# Patient Record
Sex: FEMALE | Race: WHITE | Employment: FULL TIME | URBAN - NONMETROPOLITAN AREA
[De-identification: names, ages, dates, MRNs, and addresses within clinical notes are randomized per-mention and may not be internally consistent; named-entity substitution may affect disease eponyms.]

---

## 2022-02-04 ENCOUNTER — HOSPITAL ENCOUNTER (OUTPATIENT)
Age: 47
Setting detail: OBSERVATION
Discharge: HOME | End: 2022-02-05
Attending: EMERGENCY MEDICINE | Admitting: SURGERY
Payer: COMMERCIAL

## 2022-02-04 DIAGNOSIS — T58.91XA TOXIC EFFECT OF CARBON MONOXIDE, UNINTENTIONAL, INITIAL ENCOUNTER: Primary | ICD-10-CM

## 2022-02-04 PROBLEM — Z77.29 CARBON MONOXIDE EXPOSURE: Status: ACTIVE | Noted: 2022-02-04

## 2022-02-04 LAB
ANION GAP SERPL CALCULATED.3IONS-SCNC: 16 MEQ/L (ref 8–16)
BASE EXCESS (CALCULATED): 0.9 MMOL/L (ref -2.5–2.5)
BASOPHILS # BLD: 1 %
BASOPHILS ABSOLUTE: 0.1 THOU/MM3 (ref 0–0.1)
BUN BLDV-MCNC: 17 MG/DL (ref 7–22)
CALCIUM SERPL-MCNC: 9.6 MG/DL (ref 8.5–10.5)
CARBON MONOXIDE, BLOOD: 14.2 % CO SAT
CARBON MONOXIDE, BLOOD: 47.6 % CO SAT
CARBON MONOXIDE, BLOOD: 6.4 % CO SAT
CHLORIDE BLD-SCNC: 100 MEQ/L (ref 98–111)
CO2: 22 MEQ/L (ref 23–33)
COLLECTED BY:: ABNORMAL
CREAT SERPL-MCNC: 0.8 MG/DL (ref 0.4–1.2)
DEVICE: ABNORMAL
EOSINOPHIL # BLD: 1.3 %
EOSINOPHILS ABSOLUTE: 0.1 THOU/MM3 (ref 0–0.4)
ERYTHROCYTE [DISTWIDTH] IN BLOOD BY AUTOMATED COUNT: 12.6 % (ref 11.5–14.5)
ERYTHROCYTE [DISTWIDTH] IN BLOOD BY AUTOMATED COUNT: 42.1 FL (ref 35–45)
GFR SERPL CREATININE-BSD FRML MDRD: 77 ML/MIN/1.73M2
GLUCOSE BLD-MCNC: 137 MG/DL (ref 70–108)
HCO3: 27 MMOL/L (ref 23–28)
HCT VFR BLD CALC: 42.9 % (ref 37–47)
HEMOGLOBIN: 14.3 GM/DL (ref 12–16)
IFIO2: 100
IMMATURE GRANS (ABS): 0.08 THOU/MM3 (ref 0–0.07)
IMMATURE GRANULOCYTES: 0.7 %
LACTIC ACID, SEPSIS: 1 MMOL/L (ref 0.5–1.9)
LACTIC ACID, SEPSIS: 1 MMOL/L (ref 0.5–1.9)
LYMPHOCYTES # BLD: 30.5 %
LYMPHOCYTES ABSOLUTE: 3.5 THOU/MM3 (ref 1–4.8)
MCH RBC QN AUTO: 30.8 PG (ref 26–33)
MCHC RBC AUTO-ENTMCNC: 33.3 GM/DL (ref 32.2–35.5)
MCV RBC AUTO: 92.5 FL (ref 81–99)
MONOCYTES # BLD: 6.1 %
MONOCYTES ABSOLUTE: 0.7 THOU/MM3 (ref 0.4–1.3)
NUCLEATED RED BLOOD CELLS: 0 /100 WBC
O2 SATURATION: 100 %
OSMOLALITY CALCULATION: 279.4 MOSMOL/KG (ref 275–300)
PCO2: 46 MMHG (ref 35–45)
PH BLOOD GAS: 7.37 (ref 7.35–7.45)
PLATELET # BLD: 575 THOU/MM3 (ref 130–400)
PMV BLD AUTO: 8.6 FL (ref 9.4–12.4)
PO2: 351 MMHG (ref 71–104)
POTASSIUM REFLEX MAGNESIUM: 4.5 MEQ/L (ref 3.5–5.2)
PREGNANCY, SERUM: NEGATIVE
RBC # BLD: 4.64 MILL/MM3 (ref 4.2–5.4)
SEG NEUTROPHILS: 60.4 %
SEGMENTED NEUTROPHILS ABSOLUTE COUNT: 6.9 THOU/MM3 (ref 1.8–7.7)
SODIUM BLD-SCNC: 138 MEQ/L (ref 135–145)
SOURCE, BLOOD GAS: ABNORMAL
TROPONIN T: < 0.01 NG/ML
TROPONIN T: < 0.01 NG/ML
WBC # BLD: 11.4 THOU/MM3 (ref 4.8–10.8)

## 2022-02-04 PROCEDURE — 84703 CHORIONIC GONADOTROPIN ASSAY: CPT

## 2022-02-04 PROCEDURE — 82375 ASSAY CARBOXYHB QUANT: CPT

## 2022-02-04 PROCEDURE — 80048 BASIC METABOLIC PNL TOTAL CA: CPT

## 2022-02-04 PROCEDURE — 93005 ELECTROCARDIOGRAM TRACING: CPT | Performed by: STUDENT IN AN ORGANIZED HEALTH CARE EDUCATION/TRAINING PROGRAM

## 2022-02-04 PROCEDURE — G0378 HOSPITAL OBSERVATION PER HR: HCPCS

## 2022-02-04 PROCEDURE — 83605 ASSAY OF LACTIC ACID: CPT

## 2022-02-04 PROCEDURE — 84484 ASSAY OF TROPONIN QUANT: CPT

## 2022-02-04 PROCEDURE — 6820000001 HC L2 TRAUMA SURGERY EVALUATION: Performed by: SURGERY

## 2022-02-04 PROCEDURE — 2580000003 HC RX 258: Performed by: NURSE PRACTITIONER

## 2022-02-04 PROCEDURE — 99285 EMERGENCY DEPT VISIT HI MDM: CPT

## 2022-02-04 PROCEDURE — 85025 COMPLETE CBC W/AUTO DIFF WBC: CPT

## 2022-02-04 PROCEDURE — 99219 PR INITIAL OBSERVATION CARE/DAY 50 MINUTES: CPT | Performed by: SURGERY

## 2022-02-04 PROCEDURE — 82803 BLOOD GASES ANY COMBINATION: CPT

## 2022-02-04 PROCEDURE — APPSS180 APP SPLIT SHARED TIME > 60 MINUTES: Performed by: NURSE PRACTITIONER

## 2022-02-04 PROCEDURE — 36415 COLL VENOUS BLD VENIPUNCTURE: CPT

## 2022-02-04 PROCEDURE — 36600 WITHDRAWAL OF ARTERIAL BLOOD: CPT

## 2022-02-04 RX ORDER — SODIUM CHLORIDE 9 MG/ML
25 INJECTION, SOLUTION INTRAVENOUS PRN
Status: DISCONTINUED | OUTPATIENT
Start: 2022-02-04 | End: 2022-02-05 | Stop reason: HOSPADM

## 2022-02-04 RX ORDER — SODIUM PHOSPHATE, DIBASIC AND SODIUM PHOSPHATE, MONOBASIC 7; 19 G/133ML; G/133ML
1 ENEMA RECTAL DAILY PRN
Status: DISCONTINUED | OUTPATIENT
Start: 2022-02-04 | End: 2022-02-05 | Stop reason: HOSPADM

## 2022-02-04 RX ORDER — SODIUM CHLORIDE 0.9 % (FLUSH) 0.9 %
5-40 SYRINGE (ML) INJECTION PRN
Status: DISCONTINUED | OUTPATIENT
Start: 2022-02-04 | End: 2022-02-05 | Stop reason: HOSPADM

## 2022-02-04 RX ORDER — ONDANSETRON 4 MG/1
4 TABLET, ORALLY DISINTEGRATING ORAL EVERY 8 HOURS PRN
Status: DISCONTINUED | OUTPATIENT
Start: 2022-02-04 | End: 2022-02-05 | Stop reason: HOSPADM

## 2022-02-04 RX ORDER — VENLAFAXINE HYDROCHLORIDE 75 MG/1
75 CAPSULE, EXTENDED RELEASE ORAL DAILY
COMMUNITY

## 2022-02-04 RX ORDER — SODIUM CHLORIDE 0.9 % (FLUSH) 0.9 %
5-40 SYRINGE (ML) INJECTION EVERY 12 HOURS SCHEDULED
Status: DISCONTINUED | OUTPATIENT
Start: 2022-02-04 | End: 2022-02-05 | Stop reason: HOSPADM

## 2022-02-04 RX ORDER — ONDANSETRON 2 MG/ML
4 INJECTION INTRAMUSCULAR; INTRAVENOUS EVERY 6 HOURS PRN
Status: DISCONTINUED | OUTPATIENT
Start: 2022-02-04 | End: 2022-02-05 | Stop reason: HOSPADM

## 2022-02-04 RX ADMIN — SODIUM CHLORIDE, PRESERVATIVE FREE 10 ML: 5 INJECTION INTRAVENOUS at 20:12

## 2022-02-04 ASSESSMENT — ENCOUNTER SYMPTOMS
DIARRHEA: 0
FACIAL SWELLING: 0
WHEEZING: 0
RHINORRHEA: 0
STRIDOR: 0
VOICE CHANGE: 0
EYE ITCHING: 0
ABDOMINAL PAIN: 0
CONSTIPATION: 0
TROUBLE SWALLOWING: 0
ABDOMINAL DISTENTION: 0
SORE THROAT: 0
PHOTOPHOBIA: 0
CHEST TIGHTNESS: 0
SINUS PRESSURE: 0
NAUSEA: 0
CHOKING: 0
EYE REDNESS: 0
APNEA: 0
SHORTNESS OF BREATH: 0
COLOR CHANGE: 0
COUGH: 0
BACK PAIN: 0
EYE PAIN: 0
VOMITING: 0
BLOOD IN STOOL: 0
EYE DISCHARGE: 0

## 2022-02-04 ASSESSMENT — PAIN SCALES - GENERAL: PAINLEVEL_OUTOF10: 0

## 2022-02-04 NOTE — ED NOTES
Pt ambulated to bathroom with steady gait. Pt  VSS. Pt denies dizziness.       Saima Ansari RN  02/04/22 1332

## 2022-02-04 NOTE — PROGRESS NOTES
Pt admitted to  5K6 via from ED from ED. Complaints: carbon monoxide exposure. IV into the antecubital left, condition patent and no redness. IV site free of s/s of infection or infiltration. Vital signs obtained. Assessment and data collection initiated. Two nurse skin assessment performed by Yudelka MOORE and Xiomara RN. Oriented to room. Policies and procedures for 5 explained. All questions answered with no further questions at this time. Fall prevention and safety brochure discussed with patient. Bed alarm on. Call light in reach. Oriented to room. Kostas Paul RN, RN 2/4/2022 3:41 PM     Explained patients right to have family, representative or physician notified of their admission. Patient has Declined for physician to be notified. Patient has Declined for family/representative to be notified.

## 2022-02-04 NOTE — H&P
Trauma H&P   Dr. Emmitt Sandhoff    Patient:  April Carmela  Admit date: 2/4/2022   YOB: 1975 Date of Evaluation: 2/4/2022  MRN: [id number]  Acct: [de-identified]    Injury Date:2/4/2022  Injury time:PTA  PCP: SHELLEY Rosenberg - CNP   Referring physician: Dr. Jose Jackson    Time of Trauma Surgeon Notification:  [phone] February 4, 2022  Time of Trama EMILEE Arrival: 1250  Time of Trauma Surgeon Arrival: [phone] February 4, 2022    Assessment:    Carbon monoxide exposure  Leukocytosis  Thrombocytosis   Plan:    Admit to Trauma Services    Carbon Monoxide exposure   - Telemetry monitoring              - Carboxyhemoglobin level at 6pm this evening. If abnormal, repeat in AM              - Continue NRB at 15 liters              - Troponin at 6pm              - EKG in AM              - CBC in AM              - ABG in AM              - Neuro checks q4 hours    Leukocytosis   - WBC 11.4 on arrival    - Likely reactive from above   - Afebrile   - Will monitor, repeat CBC in AM    Thrombocytosis   - platelet count 957 on arrival   - Likely reactive vs essential   - Repeat CBC in AM     General diet  Repeat labs as above  Up as tolerated  Prophylaxis: C&DB, stool softeners PRN, SCDs  Discharge disposition pending clinical course              - From home with . Anticipate home tomorrow.      Activation: []Level I (Trauma Alert) []Level II (Injury Call) [x]Level III (Trauma Consult) []Downgraded  Mode of Arrival: EMS transportation  Referring Facility: N/A   Loss of Consciousness []No [x]Yes[]Unknown  Brief Duration(min)  Mechanism of Injury:  []Motor Vehicle crash   []Single Vehicle [] []Passenger []Scene Fatality []Front Seat  []Restrained   []Air Bag Deployed   []Ejected []Rollover []Pedestrian []Trapped   Type of vehicle:   Protective Devices:   []Motorcycle  Wearing Helmet []Yes []No  []Bicycle  Wearing Helmet []Yes []No  []Fall   Distance -    []Assault    Abuse Reported []Yes []No []Gunshot  []Stabbing  []Work Related  [x]Burn: []Flame []Scald []Electrical []Chemical []Contact [x]Inhalation []House Fire  []Other:   Patient Active Problem List   Diagnosis    Carbon monoxide exposure     Subjective   Chief Complaint: Carbon monoxide exposure    History of Present Illness:  April is a 51year old female presenting to the Emergency Department for evaluation of exposure to carbon monoxide. She reports that this morning the furnace/boiler in her home was not functioning properly and her  was working on it. Shortly after, she noticed that her eyes were burning and watering and her legs felt weak. She researched CO2 poisoning on her phone and before she could tell her  what she thought was happening, she had passed out. Her  called 46 and upon EMS arrival, she was unconscious. Oxygen was applied via NRB and within a brief period, she regained consciousness. She reports that she recently recovered from [address]. She is a non smoker. She currently denies any headache, chest pain, shortness of breath, nausea, vomiting. The weakness has resolved. Review of Systems:   Review of Systems   Constitutional: Negative for activity change, appetite change, chills, diaphoresis, fatigue, fever and unexpected weight change. HENT: Negative for congestion, dental problem, drooling, ear discharge, ear pain, facial swelling, hearing loss, mouth sores, nosebleeds, postnasal drip, rhinorrhea, sinus pressure, sneezing, sore throat, tinnitus, trouble swallowing and voice change. Eyes: Negative for photophobia, pain, discharge, redness, itching and visual disturbance. Respiratory: Negative for apnea, cough, choking, chest tightness, shortness of breath, wheezing and stridor. Cardiovascular: Negative for chest pain, palpitations and leg swelling. Gastrointestinal: Negative for abdominal distention, abdominal pain, blood in stool, constipation, diarrhea, nausea and vomiting. Endocrine: Negative for cold intolerance, heat intolerance, polydipsia, polyphagia and polyuria. Genitourinary: Negative for difficulty urinating, dysuria, flank pain, frequency, hematuria and urgency. Musculoskeletal: Positive for gait problem. Negative for arthralgias, back pain, joint swelling, myalgias, neck pain and neck stiffness. Skin: Negative for color change, pallor, rash and wound. Allergic/Immunologic: Negative for environmental allergies, food allergies and immunocompromised state. Neurological: Positive for syncope and weakness. Negative for dizziness, tremors, seizures, facial asymmetry, speech difficulty, light-headedness, numbness and headaches. Hematological: Negative for adenopathy. Does not bruise/bleed easily. Psychiatric/Behavioral: Negative for agitation, behavioral problems, confusion, decreased concentration, dysphoric mood, hallucinations, self-injury, sleep disturbance and suicidal ideas. The patient is not nervous/anxious and is not hyperactive. Patient has no known allergies. History reviewed. No pertinent surgical history. History reviewed. No pertinent past medical history. History reviewed. No pertinent surgical history. Social History     Socioeconomic History    Marital status:      Spouse name: None    Number of children: None    Years of education: None    Highest education level: None   Occupational History    None   Tobacco Use    Smoking status: Never Smoker    Smokeless tobacco: Never Used   Vaping Use    Vaping Use: Never used   Substance and Sexual Activity    Alcohol use:  Yes    Drug use: Never    Sexual activity: Yes     Partners: Male   Other Topics Concern    None   Social History Narrative    None     Social Determinants of Health     Financial Resource Strain:     Difficulty of Paying Living Expenses: Not on file   Food Insecurity:     Worried About Running Out of Food in the Last Year: Not on file    [facility] in the Last Year: Not on file   Transportation Needs:     Lack of Transportation (Medical): Not on file    Lack of Transportation (Non-Medical): Not on file   Physical Activity:     Days of Exercise per Week: Not on file    Minutes of Exercise per Session: Not on file   Stress:     Feeling of Stress : Not on file   Social Connections:     Frequency of Communication with Friends and Family: Not on file    Frequency of Social Gatherings with Friends and Family: Not on file    Attends [employer] Services: Not on file    Active Member of [address] [place] [address] [employer] or Organizations: Not on file    Attends Club or Organization Meetings: Not on file    Marital Status: Not on file   Intimate Partner Violence:     Fear of Current or Ex-Partner: Not on file    Emotionally Abused: Not on file    Physically Abused: Not on file    Sexually Abused: Not on file   Housing Stability:     Unable to Pay for Housing in the Last Year: Not on file    Number of [place] in the Last Year: Not on file    Unstable Housing in the Last Year: Not on file     History reviewed. No pertinent family history.     Home medications:    Previous Medications    No medications on file       Hospital medications:  Scheduled Meds:  Continuous Infusions:  PRN Meds:  Objective   ED TRIAGE VITALS  BP: 128/88, Temp: 98 °F (36.7 °C), Pulse: 91, Resp: 12, SpO2: 100 %  Lilly Coma Scale  Eye Opening: Spontaneous  Best Verbal Response: Oriented  Best Motor Response: Obeys commands  Bela Coma Scale Score: 15  Results for orders placed or performed during the hospital encounter of 02/04/22   Carboxyhemoglobin   Result Value Ref Range    Carbon Monoxide, Blood 47.6 % CO SAT   Blood gas, arterial   Result Value Ref Range    pH, Blood Gas 7.37 7.35 - 7.45    PCO2 46 (H) 35 - 45 mmhg    PO2 351 (H) 71 - 104 mmhg    HCO3 27 23 - 28 mmol/l    Base Excess (Calculated) 0.9 -2.5 - 2.5 mmol/l    O2 Sat 100 %    IFIO2 100     DEVICE NRB     Source: ELEAZAR Mckeon     COLLECTED BY: [id number]    CBC Auto Differential   Result Value Ref Range    WBC 11.4 (H) 4.8 - 10.8 thou/mm3    RBC 4.64 4.20 - 5.40 mill/mm3    Hemoglobin 14.3 12.0 - 16.0 gm/dl    Hematocrit 42.9 37.0 - 47.0 %    MCV 92.5 81.0 - 99.0 fL    MCH 30.8 26.0 - 33.0 pg    MCHC 33.3 32.2 - 35.5 gm/dl    RDW-CV 12.6 11.5 - 14.5 %    RDW-SD 42.1 35.0 - 45.0 fL    Platelets 966 (H) 382 - 400 thou/mm3    MPV 8.6 (L) 9.4 - 12.4 fL    Seg Neutrophils 60.4 %    Lymphocytes 30.5 %    Monocytes 6.1 %    Eosinophils 1.3 %    Basophils 1.0 %    Immature Granulocytes 0.7 %    Segs Absolute 6.9 1.8 - 7.7 thou/mm3    Lymphocytes Absolute 3.5 1.0 - 4.8 thou/mm3    Monocytes Absolute 0.7 0.4 - 1.3 thou/mm3    Eosinophils Absolute 0.1 0.0 - 0.4 thou/mm3    Basophils Absolute 0.1 0.0 - 0.1 thou/mm3    Immature Grans (Abs) 0.08 (H) 0.00 - 0.07 thou/mm3    nRBC 0 /100 wbc   Basic Metabolic Panel w/ Reflex to MG   Result Value Ref Range    Sodium 138 135 - 145 meq/L    Potassium reflex Magnesium 4.5 3.5 - 5.2 meq/L    Chloride 100 98 - 111 meq/L    CO2 22 (L) 23 - 33 meq/L    Glucose 137 (H) 70 - 108 mg/dL    BUN 17 7 - 22 mg/dL    CREATININE 0.8 0.4 - 1.2 mg/dL    Calcium 9.6 8.5 - 10.5 mg/dL   Lactate, Sepsis   Result Value Ref Range    Lactic Acid, Sepsis 1.0 0.5 - 1.9 mmol/L   Lactate, Sepsis   Result Value Ref Range    Lactic Acid, Sepsis 1.0 0.5 - 1.9 mmol/L   Troponin   Result Value Ref Range    Troponin T < 0.010 ng/ml   HCG Qualitative, Serum   Result Value Ref Range    Preg, Serum NEGATIVE NEGATIVE   Anion Gap   Result Value Ref Range    Anion Gap 16.0 8.0 - 16.0 meq/L   Glomerular Filtration Rate, Estimated   Result Value Ref Range    Est, Glom Filt Rate 77 (A) ml/min/1.73m2   Osmolality   Result Value Ref Range    Osmolality Calc 279.4 275.0 - 300.0 mOsmol/kg   Carboxyhemoglobin   Result Value Ref Range    Carbon Monoxide, Blood 14.2 % CO SAT   EKG 12 Lead   Result Value Ref Range    Ventricular Rate 105 BPM    Atrial Rate 105 BPM    P-R Interval 166 ms    QRS Duration 80 ms    Q-T Interval 354 ms    QTc Calculation (Bazett) 467 ms    P Axis 53 degrees    R Axis 84 degrees    T Axis 2 degrees       Physical Exam:  Patient Vitals for the past 24 hrs:   BP Temp Temp src Pulse Resp SpO2 Height Weight   02/04/22 1232 128/88 -- -- 91 12 100 % -- --   02/04/22 1059 -- -- -- 89 10 100 % -- --   02/04/22 1029 129/86 -- -- 106 17 99 % -- --   02/04/22 1025 -- 98 °F (36.7 °C) Oral 105 17 98 % 5' 4\" (1.626 m) 150 lb (68 kg)     Primary Assessment:  Airway: Patent, trachea midline  Breathing: Breath sounds present and equal bilaterally, spontaneous, and unlabored  Circulation: Hemodynamically stable, 2+ central and peripheral pulses. Disability: GALLAGHER x 4, following commands. GCS =15    Secondary Assessment:  General: Alert, NAD. Head: Normocephalic, mid face stable. Tympanic membranes intact. Nares patent bilaterally, no epistaxis. Mouth clear of foreign bodies, no lacerations or abrasions. Eyes: PERRLA. EOMI. Nontraumatic. Neurologic: A & O x3. Following commands. CN 2-12 intact  Neck: trachea midline. Cervical spines NTTP midline, without step-offs, crepitus or deformity. Back:TL spines are NTTP midline, without step-offs, crepitus or deformity. No abrasions, contusions, or ecchymosis noted. Lungs: Clear to auscultation bilaterally. Chest Wall: Chest rise symmetrical.  Chest wall without tenderness to palpation. No crepitus, deformities, lacerations, or abrasions. Heart: RRR. Normal S1/S2. No obvious M/G/R. Abdomen:  Soft, NTTP. No guarding. Non-peritoneal.  Pelvis:  NTTP, stable to compression. Femoral pulses 2+. GI/: No blood at the urinary meatus. No gross hematuria. Extremities: No gross deformities. PMS intact. Radial /DP/PT pulses 2+ bilaterally. Skin: Skin warm and dry. Normal for ethnicity.       Radiology:     No orders to display     Fast Exam: No    Electronically signed by SHELLEY Hand - CNP on 2/4/2022 at 2:35 PM

## 2022-02-04 NOTE — ED NOTES
5K was informed of pt coming up to the floor. Pt up to 5K via wheelchair in stable condition.      Nuvia Harness  02/04/22 1730
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
## 2022-02-05 VITALS
SYSTOLIC BLOOD PRESSURE: 101 MMHG | WEIGHT: 167.55 LBS | BODY MASS INDEX: 28.6 KG/M2 | DIASTOLIC BLOOD PRESSURE: 60 MMHG | HEIGHT: 64 IN | OXYGEN SATURATION: 100 % | HEART RATE: 84 BPM | TEMPERATURE: 97.7 F | RESPIRATION RATE: 16 BRPM

## 2022-02-05 LAB
ALLEN TEST: POSITIVE
BASE EXCESS (CALCULATED): 0.7 MMOL/L (ref -2.5–2.5)
CARBON MONOXIDE, BLOOD: 5.7 % CO SAT
COLLECTED BY:: ABNORMAL
DEVICE: ABNORMAL
EKG ATRIAL RATE: 105 BPM
EKG ATRIAL RATE: 84 BPM
EKG P AXIS: 25 DEGREES
EKG P AXIS: 53 DEGREES
EKG P-R INTERVAL: 140 MS
EKG P-R INTERVAL: 166 MS
EKG Q-T INTERVAL: 354 MS
EKG Q-T INTERVAL: 380 MS
EKG QRS DURATION: 78 MS
EKG QRS DURATION: 80 MS
EKG QTC CALCULATION (BAZETT): 449 MS
EKG QTC CALCULATION (BAZETT): 467 MS
EKG R AXIS: 81 DEGREES
EKG R AXIS: 84 DEGREES
EKG T AXIS: 2 DEGREES
EKG T AXIS: 26 DEGREES
EKG VENTRICULAR RATE: 105 BPM
EKG VENTRICULAR RATE: 84 BPM
ERYTHROCYTE [DISTWIDTH] IN BLOOD BY AUTOMATED COUNT: 12.6 % (ref 11.5–14.5)
ERYTHROCYTE [DISTWIDTH] IN BLOOD BY AUTOMATED COUNT: 41.8 FL (ref 35–45)
HCO3: 25 MMOL/L (ref 23–28)
HCT VFR BLD CALC: 39.6 % (ref 37–47)
HEMOGLOBIN: 12.8 GM/DL (ref 12–16)
IFIO2: 6
MCH RBC QN AUTO: 29.8 PG (ref 26–33)
MCHC RBC AUTO-ENTMCNC: 32.3 GM/DL (ref 32.2–35.5)
MCV RBC AUTO: 92.1 FL (ref 81–99)
O2 SATURATION: 99 %
PCO2: 37 MMHG (ref 35–45)
PH BLOOD GAS: 7.43 (ref 7.35–7.45)
PLATELET # BLD: 441 THOU/MM3 (ref 130–400)
PMV BLD AUTO: 8 FL (ref 9.4–12.4)
PO2: 119 MMHG (ref 71–104)
RBC # BLD: 4.3 MILL/MM3 (ref 4.2–5.4)
SOURCE, BLOOD GAS: ABNORMAL
WBC # BLD: 9.1 THOU/MM3 (ref 4.8–10.8)

## 2022-02-05 PROCEDURE — 36600 WITHDRAWAL OF ARTERIAL BLOOD: CPT

## 2022-02-05 PROCEDURE — G0378 HOSPITAL OBSERVATION PER HR: HCPCS

## 2022-02-05 PROCEDURE — 2580000003 HC RX 258: Performed by: NURSE PRACTITIONER

## 2022-02-05 PROCEDURE — APPSS45 APP SPLIT SHARED TIME 31-45 MINUTES: Performed by: PHYSICIAN ASSISTANT

## 2022-02-05 PROCEDURE — 36415 COLL VENOUS BLD VENIPUNCTURE: CPT

## 2022-02-05 PROCEDURE — 99217 PR OBSERVATION CARE DISCHARGE MANAGEMENT: CPT | Performed by: SURGERY

## 2022-02-05 PROCEDURE — 82803 BLOOD GASES ANY COMBINATION: CPT

## 2022-02-05 PROCEDURE — 82375 ASSAY CARBOXYHB QUANT: CPT

## 2022-02-05 PROCEDURE — 85027 COMPLETE CBC AUTOMATED: CPT

## 2022-02-05 PROCEDURE — 93005 ELECTROCARDIOGRAM TRACING: CPT | Performed by: NURSE PRACTITIONER

## 2022-02-05 RX ADMIN — SODIUM CHLORIDE, PRESERVATIVE FREE 10 ML: 5 INJECTION INTRAVENOUS at 10:09

## 2022-02-05 ASSESSMENT — PAIN SCALES - GENERAL
PAINLEVEL_OUTOF10: 0
PAINLEVEL_OUTOF10: 0

## 2022-02-05 NOTE — CARE COORDINATION
2/5/22, 10:51 AM EST  DISCHARGE PLANNING EVALUATION:    April Carmela       Admitted: 2/4/2022/ [address] day: 0   Location: -06/006-A Reason for admit: Carbon monoxide exposure [Z77.29]  Toxic effect of carbon monoxide, unintentional, initial encounter Zion Guevara   PMH:  has no past medical history on file. To ED per squad, their heat was not working, her  worked on fixing the boiler. Around 0900 she started not feeling well, dizzy, headaches, and passed out  Barriers to Discharge:  Carboxyhemoglobin levels, telemetry, on room air. PCP: SHELLEY Thompson - CNP   %    Patient Goals/Plan/Treatment Preferences: Met with April, she lives home with  Melyssa Balderas and son. She has PCP and confirmed insurance. She declined needs or HH. Transportation/Food Security/Housekeeping Addressed:  No issues identified. 2/5/22, 11:01 AM EST    Patient goals/plan/ treatment preferences discussed by  and . Patient goals/plan/ treatment preferences reviewed with patient/ family. Patient/ family verbalize understanding of discharge plan and are in agreement with goal/plan/treatment preferences. Understanding was demonstrated using the teach back method. AVS provided by RN at time of discharge, which includes all necessary medical information pertaining to the patients current course of illness, treatment, post-discharge goals of care, and treatment preferences.

## 2022-02-05 NOTE — DISCHARGE SUMMARY
Discharge Summary   Trauma Services    Patient Identification:  April Carmela  : 1975  MRN: [id number]   Account: [de-identified]     Admit date: 2022  Discharge date: 22  Attending provider: Emigdio Frias MD        Primary care provider: SHELLEY Pham - CNP     Discharge Diagnoses: Active Problems:    Carbon monoxide exposure  Resolved Problems:    * No resolved hospital problems. [facility] Course:   Mari Munoz is a 51 y.o. female admitted to [facility] on 2022 for carbon monoxide poisoning from home with known loss of consciousness. Report stated patient was at home began to have headache and burning eyes to his weakness and dizziness when she told her  that they had carbon oxide poisoning and she go to the ER. Admitted under trauma services to [place]. Inpatient management included as follows:  Carbon Monoxide exposure   - Telemetry monitoring              - Carboxyhemoglobin level at 6pm this evening.  If abnormal, repeat in AM              - Continue NRB at 15 liters              - Troponin at 6pm              - EKG in AM              - CBC in AM              - ABG in AM              - Neuro checks q4 hours              -2/5 EKG, CBC, and ABG without significant derangement. Carboxyhemoglobin continues to improve 5.7.   Patient asymptomatic.     Leukocytosis              - WBC 11.4 on arrival               - Likely reactive from above              - Afebrile              - Will monitor, repeat CBC in AM              -2/5 WBC resolved 9.1 today     Thrombocytosis              - platelet count 115 on arrival              - Likely reactive vs essential              - Repeat CBC in AM              -2/5 thrombocytosis improved 441     General diet  Repeat labs as above  Up as tolerated  Prophylaxis: C&DB, stool softeners PRN, SCDs  Discharge disposition pending clinical course              - From home with .  Anticipate home today.  Patient discharged with instructions for follow-up PCP in 2 weeks, slow reintroduction to ADL. Patient agreeable to discharge location and verbalized understanding of discharge instructions, prescription precautions, and symptoms indicating return for treatment. Patient given opportunity to ask questions, all inquiries answered. Care and discharge disposition in coordination with consulting providers and trauma surgeon Dr. Chavo Zarate. Discharge Medications:     Medication List      CONTINUE taking these medications    venlafaxine 75 MG extended release capsule  Commonly known as: EFFEXOR XR             Patient Instructions:    Discharge lab work: None  Activity: Slow reintroduction to ADL  Diet: ADULT DIET; Regular    Code Status: Full Code    Follow-up visits:   No follow-up provider specified. Procedures: None    Consults:   None    Examination:  Vitals:  Vitals:    02/04/22 1630 02/04/22 2012 02/05/22 0015 02/05/22 0445   BP:  (!) 140/83 105/64 101/60   Pulse:  107 84 84   Resp:  16 16 16   Temp:  98.1 °F (36.7 °C) 98 °F (36.7 °C) 97.7 °F (36.5 °C)   TempSrc:  Oral Oral Oral   SpO2: 100% 99% 100% 100%   Weight:       Height:         Weight: Weight: 167 lb 8.8 oz (76 kg)     24 hour intake/output:    Intake/Output Summary (Last 24 hours) at 2/5/2022 0943  Last data filed at 2/4/2022 1743  Gross per 24 hour   Intake --   Output 400 ml   Net -400 ml       GENERAL: Presents sitting upright in bed unassisted, Awake and alert; In no acute distress and well nourished. SKIN: Appropriate for ethnicity, warm and dry. ENT: No apparent trauma, discharge, or hematoma bilaterally. PERRL at 3mm. Nares patient, membranes moist  CARDIO: No visible chest wall deformity. Strong/regular S1/S2. 2+ radial and DP pulses bilaterally. Capillary refill <2 sec. No extremity edema noted. PULMONARY:  Trachea midline, no increased respiratory effort, or paradoxical chest movement.  Lung sounds are clear to ascultation in all fields without adventitious sounds. ABDOMEN: Abdomen is soft, non distended. Bowel sounds present in all four quadrants. NTTP in all quadrants, absent of peritoneal signs. NEURO: GCS 15. PMS intact, moves limbs freely. No focal neurological deficits  MSK: Extremities intact and present. No new bruising, swelling, deformity, discoloration or bleeding. NTTP over major muscle groups.  strength 5/5 and equal bilaterally. Significant Diagnostics:   Radiology: No results found.     Labs:   Recent Results (from the past 72 hour(s))   EKG 12 Lead    Collection Time: 02/04/22 10:19 AM   Result Value Ref Range    Ventricular Rate 105 BPM    Atrial Rate 105 BPM    P-R Interval 166 ms    QRS Duration 80 ms    Q-T Interval 354 ms    QTc Calculation (Bazett) 467 ms    P Axis 53 degrees    R Axis 84 degrees    T Axis 2 degrees   Carboxyhemoglobin    Collection Time: 02/04/22 10:30 AM   Result Value Ref Range    Carbon Monoxide, Blood 47.6 % CO SAT   CBC Auto Differential    Collection Time: 02/04/22 10:30 AM   Result Value Ref Range    WBC 11.4 (H) 4.8 - 10.8 thou/mm3    RBC 4.64 4.20 - 5.40 mill/mm3    Hemoglobin 14.3 12.0 - 16.0 gm/dl    Hematocrit 42.9 37.0 - 47.0 %    MCV 92.5 81.0 - 99.0 fL    MCH 30.8 26.0 - 33.0 pg    MCHC 33.3 32.2 - 35.5 gm/dl    RDW-CV 12.6 11.5 - 14.5 %    RDW-SD 42.1 35.0 - 45.0 fL    Platelets 432 (H) 183 - 400 thou/mm3    MPV 8.6 (L) 9.4 - 12.4 fL    Seg Neutrophils 60.4 %    Lymphocytes 30.5 %    Monocytes 6.1 %    Eosinophils 1.3 %    Basophils 1.0 %    Immature Granulocytes 0.7 %    Segs Absolute 6.9 1.8 - 7.7 thou/mm3    Lymphocytes Absolute 3.5 1.0 - 4.8 thou/mm3    Monocytes Absolute 0.7 0.4 - 1.3 thou/mm3    Eosinophils Absolute 0.1 0.0 - 0.4 thou/mm3    Basophils Absolute 0.1 0.0 - 0.1 thou/mm3    Immature Grans (Abs) 0.08 (H) 0.00 - 0.07 thou/mm3    nRBC 0 /100 wbc   Basic Metabolic Panel w/ Reflex to MG    Collection Time: 02/04/22 10:30 AM   Result Value Ref Range    Sodium 138 135 - 145 meq/L    Potassium reflex Magnesium 4.5 3.5 - 5.2 meq/L    Chloride 100 98 - 111 meq/L    CO2 22 (L) 23 - 33 meq/L    Glucose 137 (H) 70 - 108 mg/dL    BUN 17 7 - 22 mg/dL    CREATININE 0.8 0.4 - 1.2 mg/dL    Calcium 9.6 8.5 - 10.5 mg/dL   Troponin    Collection Time: 02/04/22 10:30 AM   Result Value Ref Range    Troponin T < 0.010 ng/ml   HCG Qualitative, Serum    Collection Time: 02/04/22 10:30 AM   Result Value Ref Range    Preg, Serum NEGATIVE NEGATIVE   Anion Gap    Collection Time: 02/04/22 10:30 AM   Result Value Ref Range    Anion Gap 16.0 8.0 - 16.0 meq/L   Glomerular Filtration Rate, Estimated    Collection Time: 02/04/22 10:30 AM   Result Value Ref Range    Est, Glom Filt Rate 77 (A) ml/min/1.73m2   Osmolality    Collection Time: 02/04/22 10:30 AM   Result Value Ref Range    Osmolality Calc 279.4 275.0 - 300.0 mOsmol/kg   Blood gas, arterial    Collection Time: 02/04/22 10:51 AM   Result Value Ref Range    pH, Blood Gas 7.37 7.35 - 7.45    PCO2 46 (H) 35 - 45 mmhg    PO2 351 (H) 71 - 104 mmhg    HCO3 27 23 - 28 mmol/l    Base Excess (Calculated) 0.9 -2.5 - 2.5 mmol/l    O2 Sat 100 %    IFIO2 100     DEVICE NRB     Source: R Radial     COLLECTED BY: [id number]    Lactate, Sepsis    Collection Time: 02/04/22 10:52 AM   Result Value Ref Range    Lactic Acid, Sepsis 1.0 0.5 - 1.9 mmol/L   Lactate, Sepsis    Collection Time: 02/04/22 12:21 PM   Result Value Ref Range    Lactic Acid, Sepsis 1.0 0.5 - 1.9 mmol/L   Carboxyhemoglobin    Collection Time: 02/04/22 12:21 PM   Result Value Ref Range    Carbon Monoxide, Blood 14.2 % CO SAT   Carboxyhemoglobin    Collection Time: 02/04/22  5:40 PM   Result Value Ref Range    Carbon Monoxide, Blood 6.4 % CO SAT   Troponin    Collection Time: 02/04/22  5:40 PM   Result Value Ref Range    Troponin T < 0.010 ng/ml   CBC    Collection Time: 02/05/22  4:37 AM   Result Value Ref Range    WBC 9.1 4.8 - 10.8 thou/mm3    RBC 4.30 4.20 - 5.40 mill/mm3    Hemoglobin 12.8 12.0 - 16.0 gm/dl    Hematocrit 39.6 37.0 - 47.0 %    MCV 92.1 81.0 - 99.0 fL    MCH 29.8 26.0 - 33.0 pg    MCHC 32.3 32.2 - 35.5 gm/dl    RDW-CV 12.6 11.5 - 14.5 %    RDW-SD 41.8 35.0 - 45.0 fL    Platelets 054 (H) 711 - 400 thou/mm3    MPV 8.0 (L) 9.4 - 12.4 fL   EKG 12 Lead    Collection Time: 02/05/22  4:51 AM   Result Value Ref Range    Ventricular Rate 84 BPM    Atrial Rate 84 BPM    P-R Interval 140 ms    QRS Duration 78 ms    Q-T Interval 380 ms    QTc Calculation (Bazett) 449 ms    P Axis 25 degrees    R Axis 81 degrees    T Axis 26 degrees   Blood gas, arterial    Collection Time: 02/05/22  6:03 AM   Result Value Ref Range    pH, Blood Gas 7.43 7.35 - 7.45    PCO2 37 35 - 45 mmhg    PO2 119 (H) 71 - 104 mmhg    HCO3 25 23 - 28 mmol/l    Base Excess (Calculated) 0.7 -2.5 - 2.5 mmol/l    O2 Sat 99 %    IFIO2 6     DEVICE Cannula     Eloy Test Positive     Source: R Radial     COLLECTED BY: [id number]    Carboxyhemoglobin    Collection Time: 02/05/22  8:30 AM   Result Value Ref Range    Carbon Monoxide, Blood 5.7 % CO SAT       Discharge condition: Stable  Disposition: Home  Time spent on discharge: >60 minutes     Electronically signed by REBEL Lucas on 2/5/2022 at 9:43 AM

## 2022-02-05 NOTE — PROGRESS NOTES
Chris Robertson  Daily Progress Note  Pt Name: April R Zbigniew Wang [address] Record Number: [id number]  Date of Birth 1975   Today's Date: 2/5/2022    HD: # 0    CC:\" I feel good ready to go home\"    ASSESSMENT  1. Active Hospital Problems    Diagnosis Date Noted    Carbon monoxide exposure [Z77.29] 02/04/2022         PLAN  Admit to Trauma Services     Carbon Monoxide exposure   - Telemetry monitoring              - Carboxyhemoglobin level at 6pm this evening.  If abnormal, repeat in AM              - Continue NRB at 15 liters              - Troponin at 6pm              - EKG in AM              - CBC in AM              - ABG in AM              - Neuro checks q4 hours   -2/5 EKG, CBC, and ABG without significant derangement. Carboxyhemoglobin continues to improve 5.7. Patient asymptomatic.     Leukocytosis              - WBC 11.4 on arrival               - Likely reactive from above              - Afebrile              - Will monitor, repeat CBC in AM   -2/5 WBC resolved 9.1 today     Thrombocytosis              - platelet count 554 on arrival              - Likely reactive vs essential              - Repeat CBC in AM   -2/5 thrombocytosis improved 441     General diet  Repeat labs as above  Up as tolerated  Prophylaxis: C&DB, stool softeners PRN, SCDs  Discharge disposition pending clinical course              - From home with .  Anticipate home today. SUBJECTIVE  She is a 51 y.o. female who continues to present at [address] [place] [address] on 5K following carbon oxide poisoning at home. Patient reports self wean from oxygen, improving headache, resolved paresthesias, overall feels ready to go home and well. Patient denies chest pain, shortness of breath, cough, dizziness, lightheadedness, numbness, paraesthesias, weakness, chills, fevers, abdominal pain, nausea, vomiting,neck pain, or back pain. Nursing staff states patient stable over.  Plan to discharge home with recommendations for slow reintroduction to ADLs. . Hemoglobin stable, WBC stable, no electrolyte abnormality,  vital signs stable on exam on room air. Care in coordination with trauma surgeon Dr. Chrystal Werner. Wt Readings from Last 3 Encounters:   02/04/22 167 lb 8.8 oz (76 kg)     Temp Readings from Last 3 Encounters:   02/05/22 97.7 °F (36.5 °C) (Oral)     BP Readings from Last 3 Encounters:   02/05/22 101/60     Pulse Readings from Last 3 Encounters:   02/05/22 84       24 HR INTAKE/OUTPUT :     Intake/Output Summary (Last 24 hours) at 2/5/2022 0941  Last data filed at 2/4/2022 1743  Gross per 24 hour   Intake --   Output 400 ml   Net -400 ml     ADULT DIET; Regular    OBJECTIVE  CURRENT VITALS /60   Pulse 84   Temp 97.7 °F (36.5 °C) (Oral)   Resp 16   Ht 5' 4\" (1.626 m)   Wt 167 lb 8.8 oz (76 kg)   SpO2 100%   BMI 28.76 kg/m²   GENERAL: Presents sitting upright in bed unassisted, Awake and alert; In no acute distress and well nourished. SKIN: Appropriate for ethnicity, warm and dry. ENT: No apparent trauma, discharge, or hematoma bilaterally. PERRL at 3mm. Nares patient, membranes moist  CARDIO: No visible chest wall deformity. Strong/regular S1/S2. 2+ radial and DP pulses bilaterally. Capillary refill <2 sec. No extremity edema noted. PULMONARY:  Trachea midline, no increased respiratory effort, or paradoxical chest movement. Lung sounds are clear to ascultation in all fields without adventitious sounds. ABDOMEN: Abdomen is soft, non distended. Bowel sounds present in all four quadrants. NTTP in all quadrants, absent of peritoneal signs. NEURO: GCS 15. PMS intact, moves limbs freely. No focal neurological deficits  MSK: Extremities intact and present. No new bruising, swelling, deformity, discoloration or bleeding. NTTP over major muscle groups.  strength 5/5 and equal bilaterally.          LABS  CBC :   Recent Labs     02/04/22  1030 02/05/22  0437   WBC 11.4* 9.1   HGB 14.3 12.8   HCT 42.9 39.6   MCV 92.5 92.1   * 441*     BMP:   Recent Labs     02/04/22  1030      K 4.5      CO2 22*   BUN 17   CREATININE 0.8     COAGS: No results for input(s): APTT, PROT, INR in the last 72 hours. Pancreas/HFP:  No results for input(s): LIPASE, AMYLASE in the last 72 hours. No results for input(s): AST, ALT, BILIDIR, BILITOT, ALKPHOS in the last 72 hours. No orders to display          Electronically signed by Danielle Melvin PA-C on 2/5/2022 at 9:41 AM     Patient seen and examined independently by me early this AM. Above discussed and I agree with REBEL Paredes. See my additional comments below for updated orders and plan. Labs, cultures, and radiographs where available were reviewed. I discussed patient concerns with the patient's nurse and instructions were given. Please see our orders for the updated patient care plan. -Carboxyhemoglobin much improved. EKG, CBC and ABG okay. Neurologically at baseline. No pain. No headache. No lightheadedness or dizziness. Tolerating diet. Essentially asymptomatic. Feels good and hopes to go home. Okay for discharge today. Follow-up with PCP/trauma clinic.     Electronically signed by Igor Morrison MD on 2/5/22 at 6:40 PM EST

## 2023-05-28 ENCOUNTER — HOSPITAL ENCOUNTER (EMERGENCY)
Age: 48
Discharge: HOME | End: 2023-05-28
Payer: COMMERCIAL

## 2023-05-28 VITALS
WEIGHT: 156 LBS | BODY MASS INDEX: 26.63 KG/M2 | RESPIRATION RATE: 16 BRPM | SYSTOLIC BLOOD PRESSURE: 137 MMHG | DIASTOLIC BLOOD PRESSURE: 84 MMHG | TEMPERATURE: 97 F | HEIGHT: 64 IN | HEART RATE: 77 BPM | OXYGEN SATURATION: 95 %

## 2023-05-28 DIAGNOSIS — N30.01 ACUTE CYSTITIS WITH HEMATURIA: Primary | ICD-10-CM

## 2023-05-28 LAB
BILIRUB UR STRIP.AUTO-MCNC: NEGATIVE MG/DL
CHARACTER UR: CLEAR
COLOR: ABNORMAL
GLUCOSE UR QL STRIP.AUTO: 100 MG/DL
KETONES UR QL STRIP.AUTO: NEGATIVE
NITRITE UR QL STRIP.AUTO: POSITIVE
PH UR STRIP.AUTO: 7 [PH] (ref 5–9)
PROT UR STRIP.AUTO-MCNC: 100 MG/DL
RBC #/AREA URNS HPF: ABNORMAL /[HPF]
SP GR UR STRIP.AUTO: 1.01 (ref 1–1.03)
UROBILINOGEN, URINE: 0.2 EU/DL (ref 0.2–1)
WBC #/AREA URNS HPF: ABNORMAL /[HPF]

## 2023-05-28 PROCEDURE — 81003 URINALYSIS AUTO W/O SCOPE: CPT

## 2023-05-28 PROCEDURE — 87086 URINE CULTURE/COLONY COUNT: CPT

## 2023-05-28 PROCEDURE — 87186 SC STD MICRODIL/AGAR DIL: CPT

## 2023-05-28 PROCEDURE — 87077 CULTURE AEROBIC IDENTIFY: CPT

## 2023-05-28 PROCEDURE — 99203 OFFICE O/P NEW LOW 30 MIN: CPT | Performed by: NURSE PRACTITIONER

## 2023-05-28 PROCEDURE — 99213 OFFICE O/P EST LOW 20 MIN: CPT

## 2023-05-28 RX ORDER — NITROFURANTOIN 25; 75 MG/1; MG/1
100 CAPSULE ORAL 2 TIMES DAILY
Qty: 14 CAPSULE | Refills: 0 | Status: SHIPPED | OUTPATIENT
Start: 2023-05-28 | End: 2023-06-04

## 2023-05-28 ASSESSMENT — ENCOUNTER SYMPTOMS
DIARRHEA: 0
EYE PAIN: 0
VOMITING: 0
NAUSEA: 0
COUGH: 0
SHORTNESS OF BREATH: 0
RHINORRHEA: 0
BLOOD IN STOOL: 0
ABDOMINAL PAIN: 0
WHEEZING: 0
CONSTIPATION: 0

## 2023-05-30 LAB
BACTERIA UR CULT: ABNORMAL
ORGANISM: ABNORMAL

## 2025-08-05 ENCOUNTER — HOSPITAL ENCOUNTER (OUTPATIENT)
Age: 50
End: 2025-08-05

## 2025-08-06 ENCOUNTER — HOSPITAL ENCOUNTER (OUTPATIENT)
Age: 50
Discharge: HOME | End: 2025-08-06
Payer: COMMERCIAL

## 2025-08-06 LAB
25(OH)D3 SERPL-MCNC: 38 NG/ML (ref 30–100)
VIT B12 SERPL-MCNC: 994 PG/ML (ref 232–1245)

## 2025-08-06 PROCEDURE — 82306 VITAMIN D 25 HYDROXY: CPT

## 2025-08-06 PROCEDURE — 36415 COLL VENOUS BLD VENIPUNCTURE: CPT

## 2025-08-06 PROCEDURE — 82607 VITAMIN B-12: CPT

## 2025-08-08 ENCOUNTER — HOSPITAL ENCOUNTER (OUTPATIENT)
Dept: GENERAL RADIOLOGY | Age: 50
Discharge: HOME | End: 2025-08-08

## 2025-08-24 LAB — MISC. #1 REFERENCE GROUP TEST: NORMAL
